# Patient Record
Sex: FEMALE | Race: WHITE | NOT HISPANIC OR LATINO | Employment: UNEMPLOYED | URBAN - NONMETROPOLITAN AREA
[De-identification: names, ages, dates, MRNs, and addresses within clinical notes are randomized per-mention and may not be internally consistent; named-entity substitution may affect disease eponyms.]

---

## 2020-08-21 ENCOUNTER — OFFICE VISIT (OUTPATIENT)
Dept: INTERNAL MEDICINE | Facility: CLINIC | Age: 45
End: 2020-08-21

## 2020-08-21 VITALS
HEIGHT: 64 IN | HEART RATE: 123 BPM | WEIGHT: 181 LBS | SYSTOLIC BLOOD PRESSURE: 136 MMHG | DIASTOLIC BLOOD PRESSURE: 84 MMHG | TEMPERATURE: 98 F | BODY MASS INDEX: 30.9 KG/M2 | OXYGEN SATURATION: 97 %

## 2020-08-21 DIAGNOSIS — Z13.220 LIPID SCREENING: ICD-10-CM

## 2020-08-21 DIAGNOSIS — Z11.3 SCREEN FOR STD (SEXUALLY TRANSMITTED DISEASE): ICD-10-CM

## 2020-08-21 DIAGNOSIS — F32.A ANXIETY AND DEPRESSION: ICD-10-CM

## 2020-08-21 DIAGNOSIS — R53.83 FATIGUE, UNSPECIFIED TYPE: ICD-10-CM

## 2020-08-21 DIAGNOSIS — F41.9 ANXIETY AND DEPRESSION: ICD-10-CM

## 2020-08-21 DIAGNOSIS — Z00.00 WELL ADULT EXAM: Primary | ICD-10-CM

## 2020-08-21 DIAGNOSIS — N92.6 MENSTRUAL IRREGULARITY: ICD-10-CM

## 2020-08-21 DIAGNOSIS — R20.2 PARESTHESIA: ICD-10-CM

## 2020-08-21 PROCEDURE — 99386 PREV VISIT NEW AGE 40-64: CPT | Performed by: FAMILY MEDICINE

## 2020-08-21 RX ORDER — IBUPROFEN 400 MG/1
400 TABLET ORAL EVERY 6 HOURS PRN
COMMUNITY

## 2020-08-21 RX ORDER — CITALOPRAM 20 MG/1
20 TABLET ORAL DAILY
Qty: 30 TABLET | Refills: 5 | Status: SHIPPED | OUTPATIENT
Start: 2020-08-21 | End: 2020-11-18 | Stop reason: SDUPTHER

## 2020-08-22 LAB
HCV AB S/CO SERPL IA: <0.1 S/CO RATIO (ref 0–0.9)
HIV 1+2 AB+HIV1 P24 AG SERPL QL IA: NON REACTIVE

## 2020-08-27 LAB
ALBUMIN SERPL-MCNC: 4.1 G/DL (ref 3.5–5.2)
ALBUMIN/GLOB SERPL: 1.8 G/DL
ALP SERPL-CCNC: 78 U/L (ref 39–117)
ALT SERPL-CCNC: 20 U/L (ref 1–33)
AST SERPL-CCNC: 21 U/L (ref 1–32)
BASOPHILS # BLD AUTO: 0.02 10*3/MM3 (ref 0–0.2)
BASOPHILS NFR BLD AUTO: 0.4 % (ref 0–1.5)
BILIRUB SERPL-MCNC: 0.3 MG/DL (ref 0–1.2)
BUN SERPL-MCNC: 10 MG/DL (ref 6–20)
BUN/CREAT SERPL: 17.9 (ref 7–25)
CALCIUM SERPL-MCNC: 9 MG/DL (ref 8.6–10.5)
CHLORIDE SERPL-SCNC: 104 MMOL/L (ref 98–107)
CHOLEST SERPL-MCNC: 122 MG/DL (ref 0–200)
CO2 SERPL-SCNC: 26.2 MMOL/L (ref 22–29)
CREAT SERPL-MCNC: 0.56 MG/DL (ref 0.57–1)
EOSINOPHIL # BLD AUTO: 0.06 10*3/MM3 (ref 0–0.4)
EOSINOPHIL NFR BLD AUTO: 1.1 % (ref 0.3–6.2)
ERYTHROCYTE [DISTWIDTH] IN BLOOD BY AUTOMATED COUNT: 11.9 % (ref 12.3–15.4)
ESTROGEN SERPL-MCNC: 140 PG/ML
FOLATE SERPL-MCNC: 16.9 NG/ML (ref 4.78–24.2)
FSH SERPL-ACNC: 9.6 MIU/ML
GLOBULIN SER CALC-MCNC: 2.3 GM/DL
GLUCOSE SERPL-MCNC: 78 MG/DL (ref 65–99)
HCT VFR BLD AUTO: 42.9 % (ref 34–46.6)
HDLC SERPL-MCNC: 54 MG/DL (ref 40–60)
HGB BLD-MCNC: 15.1 G/DL (ref 12–15.9)
IMM GRANULOCYTES # BLD AUTO: 0.02 10*3/MM3 (ref 0–0.05)
IMM GRANULOCYTES NFR BLD AUTO: 0.4 % (ref 0–0.5)
LDLC SERPL CALC-MCNC: 55 MG/DL (ref 0–100)
LH SERPL-ACNC: 6.2 MIU/ML
LYMPHOCYTES # BLD AUTO: 1.43 10*3/MM3 (ref 0.7–3.1)
LYMPHOCYTES NFR BLD AUTO: 25.9 % (ref 19.6–45.3)
MAGNESIUM SERPL-MCNC: 1.9 MG/DL (ref 1.6–2.6)
MCH RBC QN AUTO: 34.2 PG (ref 26.6–33)
MCHC RBC AUTO-ENTMCNC: 35.2 G/DL (ref 31.5–35.7)
MCV RBC AUTO: 97.1 FL (ref 79–97)
MONOCYTES # BLD AUTO: 0.55 10*3/MM3 (ref 0.1–0.9)
MONOCYTES NFR BLD AUTO: 10 % (ref 5–12)
NEUTROPHILS # BLD AUTO: 3.44 10*3/MM3 (ref 1.7–7)
NEUTROPHILS NFR BLD AUTO: 62.2 % (ref 42.7–76)
NRBC BLD AUTO-RTO: 0 /100 WBC (ref 0–0.2)
PLATELET # BLD AUTO: 127 10*3/MM3 (ref 140–450)
POTASSIUM SERPL-SCNC: 3.8 MMOL/L (ref 3.5–5.2)
PROT SERPL-MCNC: 6.4 G/DL (ref 6–8.5)
RBC # BLD AUTO: 4.42 10*6/MM3 (ref 3.77–5.28)
SODIUM SERPL-SCNC: 140 MMOL/L (ref 136–145)
T4 FREE SERPL-MCNC: 1.49 NG/DL (ref 0.93–1.7)
TRIGL SERPL-MCNC: 66 MG/DL (ref 0–150)
TSH SERPL DL<=0.005 MIU/L-ACNC: 0.16 UIU/ML (ref 0.27–4.2)
VIT B12 SERPL-MCNC: 516 PG/ML (ref 211–946)
VLDLC SERPL CALC-MCNC: 13.2 MG/DL
WBC # BLD AUTO: 5.52 10*3/MM3 (ref 3.4–10.8)

## 2020-09-01 ENCOUNTER — TELEPHONE (OUTPATIENT)
Dept: INTERNAL MEDICINE | Facility: CLINIC | Age: 45
End: 2020-09-01

## 2020-09-01 DIAGNOSIS — B97.7 HPV IN FEMALE: ICD-10-CM

## 2020-09-01 DIAGNOSIS — R87.611 ATYPICAL SQUAMOUS CELLS CANNOT EXCLUDE HIGH GRADE SQUAMOUS INTRAEPITHELIAL LESION ON CYTOLOGIC SMEAR OF CERVIX (ASC-H): Primary | ICD-10-CM

## 2020-09-01 NOTE — TELEPHONE ENCOUNTER
Caller: Miroslava Young    Relationship: Self    Best call back number: [phone]     Caller requesting test results: YES     What test was performed: LABS     When was the test performed: 08/21/20    Where was the test performed: LAB IN OFFICE    Additional notes: REQUESTING A CALL BACK TO DISCUSS RESULTS

## 2020-09-02 ENCOUNTER — TELEPHONE (OUTPATIENT)
Dept: INTERNAL MEDICINE | Facility: CLINIC | Age: 45
End: 2020-09-02

## 2020-09-02 NOTE — TELEPHONE ENCOUNTER
PATIENT HAS CALLED REQUESTING A CALL BACK IN REGARDS TO A REFERRAL SHE IS SUPPOSE TO HAVE FOR A GYNECOLOGIST APPOINTMENT    PATIENT WANTED TO KNOW IF SHE NEEDS TO CALL FOR THE APPOINTMENT OR WILL SOMEONE SCHEDULE AND NOTIFY HER    PATIENT IS REQUESTING A CALL BACK AS SOON AS POSSIBLE     BEST CALL BACK NUMBER [phone]

## 2020-09-07 PROBLEM — Z00.00 WELL ADULT EXAM: Status: ACTIVE | Noted: 2020-09-07

## 2020-09-07 PROBLEM — F41.9 ANXIETY AND DEPRESSION: Status: ACTIVE | Noted: 2020-09-07

## 2020-09-07 PROBLEM — F32.A ANXIETY AND DEPRESSION: Status: ACTIVE | Noted: 2020-09-07

## 2020-09-08 NOTE — ASSESSMENT & PLAN NOTE
Abnormal female exam with irritation to cervical os.  Pap collected today with STD testing.  Counseled on healthy diet, exercise, dental/eye health, mammograms, pap smears, colonoscopy.  Will obtain records of previous health maintenance testing.  Will check for HIV and hepatiti C as well with blood work.

## 2020-09-08 NOTE — ASSESSMENT & PLAN NOTE
Uncontrolled. Will start celexa.  Patient advised of potential side effects of the medication and time frame in which it will become effective.

## 2020-09-10 ENCOUNTER — OFFICE VISIT (OUTPATIENT)
Dept: OBSTETRICS AND GYNECOLOGY | Facility: CLINIC | Age: 45
End: 2020-09-10

## 2020-09-10 VITALS
BODY MASS INDEX: 30.56 KG/M2 | WEIGHT: 179 LBS | SYSTOLIC BLOOD PRESSURE: 132 MMHG | DIASTOLIC BLOOD PRESSURE: 82 MMHG | HEIGHT: 64 IN

## 2020-09-10 DIAGNOSIS — R87.610 ATYPICAL SQUAMOUS CELL CHANGES OF UNDETERMINED SIGNIFICANCE (ASCUS) ON CERVICAL CYTOLOGY WITH POSITIVE HIGH RISK HUMAN PAPILLOMA VIRUS (HPV): Primary | ICD-10-CM

## 2020-09-10 DIAGNOSIS — R87.810 ATYPICAL SQUAMOUS CELL CHANGES OF UNDETERMINED SIGNIFICANCE (ASCUS) ON CERVICAL CYTOLOGY WITH POSITIVE HIGH RISK HUMAN PAPILLOMA VIRUS (HPV): Primary | ICD-10-CM

## 2020-09-10 LAB
B-HCG UR QL: NEGATIVE
INTERNAL NEGATIVE CONTROL: NEGATIVE
INTERNAL POSITIVE CONTROL: POSITIVE
Lab: NORMAL

## 2020-09-10 PROCEDURE — 57454 BX/CURETT OF CERVIX W/SCOPE: CPT | Performed by: PHYSICIAN ASSISTANT

## 2020-09-10 PROCEDURE — 81025 URINE PREGNANCY TEST: CPT | Performed by: PHYSICIAN ASSISTANT

## 2020-09-10 NOTE — PROGRESS NOTES
Colposcopy    Date of procedure:  9/10/2020    Risks and benefits discussed? yes  All questions answered? yes  Consents given by the patient  Written consent obtained? yes    Pre-op indication: ASCUS with POSITIVE high risk HPV  Procedure documentation:    The cervix was bathed in acetic acid.   The findings were as follows:      The transformation zone was not able to be seen adequately.    acetowhite noted at 12 o'clock    Ectocervical biopsies were taken from 12 o'clock.      An ECC was performed. Monsels solution tampon was applied to cervix. Patient tolerated the procedure well. EBL minimal      Colposcopic Impression: 1.   2. Inadequate colposcopy  3. Colposcopic findings are consistent with PAP       Plan: Will base further treatment on pathology results      This note was electronically signed.    Sissy Boggs PA-C  September 10, 2020

## 2020-09-10 NOTE — PATIENT INSTRUCTIONS
Remove tampon in 2 hours  No intercourse for one week  Will contact with biopsy results in about 1 week  Call office if any heavy bleeding, pelvic pain, discharge with odor or any concerns

## 2020-09-10 NOTE — PROGRESS NOTES
Subjective   Chief Complaint   Patient presents with   • Colposcopy     referral for abnormal pap.  ASCUS with positive HPV       Miroslava Young is a 44 y.o. year old  presenting to be seen for abnormal pap.  Recent pap per her PCP noted ASCUS pap non 16/18 HPV positive  Patient reports her last pap prior to recent pap was 12 years ago  She reports an abnormal pap age 15 with cryo done     Past Medical History:   Diagnosis Date   • Abnormal Pap smear of cervix    • Anxiety    • Depression    • HPV (human papilloma virus) infection    • Kidney stone    • Ulcerative colitis (CMS/HCC)         Current Outpatient Medications:   •  citalopram (CeleXA) 20 MG tablet, Take 1 tablet by mouth Daily., Disp: 30 tablet, Rfl: 5  •  ibuprofen (ADVIL,MOTRIN) 400 MG tablet, Take 400 mg by mouth Every 6 (Six) Hours As Needed for Mild Pain  or Headache., Disp: , Rfl:    Allergies   Allergen Reactions   • Morphine GI Intolerance     Vomit and diarrhea      Past Surgical History:   Procedure Laterality Date   • GYNECOLOGIC CRYOSURGERY     • KIDNEY STONE SURGERY     • PILONIDAL CYSTECTOMY     • WISDOM TOOTH EXTRACTION        Social History     Socioeconomic History   • Marital status:      Spouse name: Not on file   • Number of children: Not on file   • Years of education: Not on file   • Highest education level: Not on file   Tobacco Use   • Smoking status: Current Every Day Smoker     Packs/day: 1.00     Years: 30.00     Pack years: 30.00     Types: Cigarettes   • Smokeless tobacco: Never Used   Substance and Sexual Activity   • Alcohol use: Yes     Frequency: 2-3 times a week     Drinks per session: 7 to 9     Binge frequency: Weekly     Comment: patient reports drinking a pint of vodka twice weekly    • Drug use: Yes     Types: Marijuana     Comment: long time ago   • Sexual activity: Not Currently     Partners: Male     Birth control/protection: Condom      Family History   Problem Relation Age of Onset   • Colon cancer  "Mother    • Transient ischemic attack Paternal Grandmother    • Brain cancer Paternal Grandfather        Review of Systems        Objective   /82   Ht 162.6 cm (64\")   Wt 81.2 kg (179 lb)   LMP 08/15/2020 (Approximate)   Breastfeeding No   BMI 30.73 kg/m²     Physical Exam         Assessment and Plan  Miroslava was seen today for colposcopy.    Diagnoses and all orders for this visit:    Atypical squamous cell changes of undetermined significance (ASCUS) on cervical cytology with positive high risk human papilloma virus (HPV)  -     POC Pregnancy, Urine      Patient Instructions   Remove tampon in 2 hours  No intercourse for one week  Will contact with biopsy results in about 1 week  Call office if any heavy bleeding, pelvic pain, discharge with odor or any concerns              This note was electronically signed.    Sissy Boggs PA-C   September 10, 2020  "

## 2020-09-17 DIAGNOSIS — R87.810 ATYPICAL SQUAMOUS CELL CHANGES OF UNDETERMINED SIGNIFICANCE (ASCUS) ON CERVICAL CYTOLOGY WITH POSITIVE HIGH RISK HUMAN PAPILLOMA VIRUS (HPV): ICD-10-CM

## 2020-09-17 DIAGNOSIS — R87.610 ATYPICAL SQUAMOUS CELL CHANGES OF UNDETERMINED SIGNIFICANCE (ASCUS) ON CERVICAL CYTOLOGY WITH POSITIVE HIGH RISK HUMAN PAPILLOMA VIRUS (HPV): ICD-10-CM

## 2020-10-20 ENCOUNTER — OFFICE VISIT (OUTPATIENT)
Dept: INTERNAL MEDICINE | Facility: CLINIC | Age: 45
End: 2020-10-20

## 2020-10-20 VITALS
OXYGEN SATURATION: 99 % | SYSTOLIC BLOOD PRESSURE: 132 MMHG | WEIGHT: 179.8 LBS | TEMPERATURE: 98.4 F | BODY MASS INDEX: 30.7 KG/M2 | HEART RATE: 74 BPM | DIASTOLIC BLOOD PRESSURE: 90 MMHG | HEIGHT: 64 IN

## 2020-10-20 DIAGNOSIS — F41.9 ANXIETY AND DEPRESSION: Primary | ICD-10-CM

## 2020-10-20 DIAGNOSIS — Z12.11 SCREEN FOR COLON CANCER: ICD-10-CM

## 2020-10-20 DIAGNOSIS — R03.0 ELEVATED BP WITHOUT DIAGNOSIS OF HYPERTENSION: ICD-10-CM

## 2020-10-20 DIAGNOSIS — F32.A ANXIETY AND DEPRESSION: Primary | ICD-10-CM

## 2020-10-20 PROCEDURE — 99214 OFFICE O/P EST MOD 30 MIN: CPT | Performed by: FAMILY MEDICINE

## 2020-10-20 NOTE — PROGRESS NOTES
Miroslava Young is a 45 y.o. female.    Chief Complaint   Patient presents with   • Anxiety   • Depression       HPI   Patient is doing well with anxiety and depression.  She is taking celexa with some improvement.  She does admit to wild dreams on the medication.  She reports she has stopped drinking hard liquor.  She is drinking 1 alcoholic beverage a day that is no more than 12%.  She does feel nervous, but it is improved.  She reports doing better with depression as well.  No feelings of hopelessness or worthlessness.  Denies any suicidal thoughts since starting celexa.      Patient reports BP has been running higher end of normal for the past couple of years.  Blood pressure is slightly elevated in the office today.  She does not have any formal diagnosis of hypertension.    The following portions of the patient's history were reviewed and updated as appropriate: allergies, current medications, past family history, past medical history, past social history, past surgical history and problem list.     Allergies   Allergen Reactions   • Morphine GI Intolerance     Vomit and diarrhea         Current Outpatient Medications:   •  citalopram (CeleXA) 20 MG tablet, Take 1 tablet by mouth Daily., Disp: 30 tablet, Rfl: 5  •  ibuprofen (ADVIL,MOTRIN) 400 MG tablet, Take 400 mg by mouth Every 6 (Six) Hours As Needed for Mild Pain  or Headache., Disp: , Rfl:     ROS    Review of Systems   Constitutional: Negative for chills, fatigue and fever.   HENT: Negative for congestion, postnasal drip and sore throat.    Respiratory: Negative for cough and shortness of breath.    Cardiovascular: Negative for chest pain.   Gastrointestinal: Positive for diarrhea (occasional). Negative for abdominal pain, constipation, nausea and vomiting.   Neurological: Negative for weakness, numbness and headache.   Psychiatric/Behavioral: Negative for depressed mood. The patient is nervous/anxious.        Vitals:    10/20/20 1330   BP: 132/90   BP  "Location: Left arm   Patient Position: Sitting   Cuff Size: Adult   Pulse: 74   Temp: 98.4 °F (36.9 °C)   TempSrc: Temporal   SpO2: 99%   Weight: 81.6 kg (179 lb 12.8 oz)   Height: 162.6 cm (64\")     Body mass index is 30.86 kg/m².    Physical Exam     Physical Exam  Constitutional:       General: She is not in acute distress.     Appearance: She is well-developed.   HENT:      Head: Normocephalic and atraumatic.      Right Ear: External ear normal.      Left Ear: External ear normal.   Eyes:      Extraocular Movements: Extraocular movements intact.      Conjunctiva/sclera: Conjunctivae normal.   Cardiovascular:      Rate and Rhythm: Normal rate and regular rhythm.      Heart sounds: No murmur.   Pulmonary:      Effort: Pulmonary effort is normal. No respiratory distress.      Breath sounds: Normal breath sounds. No wheezing.   Abdominal:      General: Bowel sounds are normal. There is no distension.      Palpations: Abdomen is soft.      Tenderness: There is no abdominal tenderness.   Skin:     General: Skin is warm and dry.   Neurological:      Mental Status: She is alert and oriented to person, place, and time.      Cranial Nerves: No cranial nerve deficit.   Psychiatric:         Mood and Affect: Mood normal.         Behavior: Behavior normal.         Assessment/Plan    Problems Addressed this Visit        Other    Anxiety and depression - Primary     Improved on current medication.  Patient will continue Celexa.  Will also place referral to a counselor within [facility].         Relevant Orders    Ambulatory Referral to Psychology    Elevated BP without diagnosis of hypertension     Patient has been encouraged to follow a low-salt diet.  Discussed exercising can help bring blood pressure down in addition to weight loss.  We will follow-up in a couple of months.  If blood pressure remains elevated, will place on antihypertensive.           Other Visit Diagnoses     Screen for colon cancer        Relevant " Orders    Ambulatory Referral to General Surgery          No orders of the defined types were placed in this encounter.      No orders of the defined types were placed in this encounter.      Return in about 2 months (around 12/20/2020) for elevated BP, anxiety and depression.    Alicia Palomo, DO

## 2020-10-20 NOTE — PATIENT INSTRUCTIONS
You may want to contact [facility] or [facility] for counseling.

## 2020-10-20 NOTE — ASSESSMENT & PLAN NOTE
Patient has been encouraged to follow a low-salt diet.  Discussed exercising can help bring blood pressure down in addition to weight loss.  We will follow-up in a couple of months.  If blood pressure remains elevated, will place on antihypertensive.

## 2020-10-20 NOTE — ASSESSMENT & PLAN NOTE
Improved on current medication.  Patient will continue Celexa.  Will also place referral to a counselor within [facility].

## 2020-10-21 ENCOUNTER — TELEPHONE (OUTPATIENT)
Dept: SURGERY | Facility: CLINIC | Age: 45
End: 2020-10-21

## 2020-10-27 ENCOUNTER — TELEPHONE (OUTPATIENT)
Dept: SURGERY | Facility: CLINIC | Age: 45
End: 2020-10-27

## 2020-10-27 DIAGNOSIS — Z01.818 PRE-OP TESTING: Primary | ICD-10-CM

## 2020-10-27 NOTE — TELEPHONE ENCOUNTER
PRESCREENING FOR OPEN ACCESS SCHEDULING    Mirolsava Young, 1975  [id number]    10/27/20    If, the patient answers yes to any of the following questions the provider will be informed prior to scheduling open access for approval and documented in the chart.    []  Yes  [] No    1. Have you ever had a colonoscopy in the past?      When:        Where:       Polyps or other:     []  Yes  [] No    2. Family history of colon cancer?      Relation:       Age of onset:       Do you currently have any of the following?    []  Yes  [x] No  Rectal bleeding, if so, how long?     []  Yes  [x] No  Abdominal pain, if so, how long?    []  Yes  [x] No  Constipation, if so, how long?    []  Yes  [x] No  Diarrhea, if so, how long?    []  Yes  [x] No  Weight loss, is so, how much?    [] Yes  [x] No  Small caliber stool, if so, how long?      Have you ever had any of the following conditions?    [] Yes  [x] No  Heart attack?      When?       Last cardiac workup?     Blood thinners?    [] Yes  [x] No   Lung problems, asthma or COPD?  [] Yes  [x] No  Oxygen required?       [] Yes  [x] No  Stroke?     [] Yes  [x] No  Have you ever had a reaction to anesthesia?

## 2020-10-28 ENCOUNTER — PREP FOR SURGERY (OUTPATIENT)
Dept: OTHER | Facility: HOSPITAL | Age: 45
End: 2020-10-28

## 2020-10-28 DIAGNOSIS — Z12.11 COLON CANCER SCREENING: Primary | ICD-10-CM

## 2020-10-28 RX ORDER — BISACODYL 5 MG
TABLET, DELAYED RELEASE (ENTERIC COATED) ORAL
Qty: 4 TABLET | Refills: 0 | Status: SHIPPED | OUTPATIENT
Start: 2020-10-28

## 2020-10-28 RX ORDER — POLYETHYLENE GLYCOL 3350 17 G/17G
238 POWDER, FOR SOLUTION ORAL ONCE
Qty: 1 PACKET | Refills: 0 | Status: SHIPPED | OUTPATIENT
Start: 2020-10-28 | End: 2020-10-28

## 2020-10-29 PROBLEM — Z12.11 COLON CANCER SCREENING: Status: ACTIVE | Noted: 2020-10-29

## 2020-11-18 ENCOUNTER — TELEPHONE (OUTPATIENT)
Dept: INTERNAL MEDICINE | Facility: CLINIC | Age: 45
End: 2020-11-18

## 2020-11-18 RX ORDER — CITALOPRAM 40 MG/1
40 TABLET ORAL DAILY
Qty: 30 TABLET | Refills: 5 | Status: SHIPPED | OUTPATIENT
Start: 2020-11-18

## 2020-11-18 NOTE — TELEPHONE ENCOUNTER
PT CALLED STATING AT HER LAST APPT INCREASING HER DOSAGE WAS DISCUSSED    PT STATED MEDICATION IS NOT WORKING AND SHE IS REQUESTING AN INCREASE ON:  citalopram (CeleXA) 20 MG tablet    PLEASE ADVISE AT: [id number]     [facility] [address] [place] [address] [phone] [facility] [phone] FX     PT HAS FIVE PILLS LEFT

## 2020-11-19 ENCOUNTER — TELEPHONE (OUTPATIENT)
Dept: SURGERY | Facility: CLINIC | Age: 45
End: 2020-11-19